# Patient Record
Sex: MALE | Race: WHITE | Employment: OTHER | URBAN - METROPOLITAN AREA
[De-identification: names, ages, dates, MRNs, and addresses within clinical notes are randomized per-mention and may not be internally consistent; named-entity substitution may affect disease eponyms.]

---

## 2022-11-04 DIAGNOSIS — M81.0 AGE-RELATED OSTEOPOROSIS WITHOUT CURRENT PATHOLOGICAL FRACTURE: Primary | ICD-10-CM

## 2022-11-04 RX ORDER — DIPHENHYDRAMINE HYDROCHLORIDE 50 MG/ML
50 INJECTION INTRAMUSCULAR; INTRAVENOUS
OUTPATIENT
Start: 2022-11-04

## 2022-11-04 RX ORDER — ACETAMINOPHEN 325 MG/1
650 TABLET ORAL
OUTPATIENT
Start: 2022-11-04

## 2022-11-04 RX ORDER — SODIUM CHLORIDE 9 MG/ML
INJECTION, SOLUTION INTRAVENOUS CONTINUOUS
OUTPATIENT
Start: 2022-11-04

## 2022-11-04 RX ORDER — ONDANSETRON 2 MG/ML
8 INJECTION INTRAMUSCULAR; INTRAVENOUS
OUTPATIENT
Start: 2022-11-04

## 2022-11-04 RX ORDER — EPINEPHRINE 1 MG/ML
0.3 INJECTION, SOLUTION, CONCENTRATE INTRAVENOUS PRN
OUTPATIENT
Start: 2022-11-04

## 2022-11-04 RX ORDER — ALBUTEROL SULFATE 90 UG/1
4 AEROSOL, METERED RESPIRATORY (INHALATION) PRN
OUTPATIENT
Start: 2022-11-04

## 2023-06-19 ENCOUNTER — HOSPITAL ENCOUNTER (OUTPATIENT)
Dept: VASCULAR LAB | Age: 83
Discharge: HOME | End: 2023-06-19
Payer: MEDICARE

## 2023-06-19 DIAGNOSIS — I65.23 BILATERAL CAROTID ARTERY STENOSIS: ICD-10-CM

## 2023-06-19 PROCEDURE — 93880 EXTRACRANIAL BILAT STUDY: CPT

## 2023-09-26 LAB
PROSTATE SPECIFIC ANTIGEN: 0.38 NG/ML
TESTOSTERONE TOTAL: 8.72 NG/DL

## 2024-01-29 LAB
PROSTATE SPECIFIC ANTIGEN: 0.49 NG/ML
TESTOSTERONE TOTAL: 6.95 NG/DL

## 2024-04-09 ENCOUNTER — TELEPHONE (OUTPATIENT)
Dept: UROLOGY | Age: 84
End: 2024-04-09

## 2024-04-09 LAB
PROSTATE SPECIFIC ANTIGEN: <0.06 NG/ML
TESTOSTERONE TOTAL: 12.5 NG/DL

## 2024-04-09 NOTE — TELEPHONE ENCOUNTER
Patient scheduled for [facility] 4/15, needs PA. Patient coming from [facility].

## 2024-04-15 ENCOUNTER — OFFICE VISIT (OUTPATIENT)
Dept: UROLOGY | Age: 84
End: 2024-04-15
Payer: MEDICARE

## 2024-04-15 VITALS
TEMPERATURE: 97.5 F | BODY MASS INDEX: 23.05 KG/M2 | HEART RATE: 52 BPM | HEIGHT: 70 IN | OXYGEN SATURATION: 99 % | WEIGHT: 161 LBS | DIASTOLIC BLOOD PRESSURE: 81 MMHG | SYSTOLIC BLOOD PRESSURE: 134 MMHG

## 2024-04-15 DIAGNOSIS — C61 PROSTATE CANCER (HCC): Primary | ICD-10-CM

## 2024-04-15 DIAGNOSIS — R97.20 RISING PSA LEVEL: ICD-10-CM

## 2024-04-15 PROCEDURE — 1123F ACP DISCUSS/DSCN MKR DOCD: CPT | Performed by: NURSE PRACTITIONER

## 2024-04-15 PROCEDURE — 99204 OFFICE O/P NEW MOD 45 MIN: CPT | Performed by: NURSE PRACTITIONER

## 2024-04-15 RX ORDER — FAMOTIDINE 20 MG/1
20 TABLET, FILM COATED ORAL 2 TIMES DAILY
COMMUNITY

## 2024-04-15 RX ORDER — AMLODIPINE BESYLATE 5 MG/1
5 TABLET ORAL DAILY
COMMUNITY

## 2024-04-15 RX ORDER — ASPIRIN 81 MG/1
81 TABLET, CHEWABLE ORAL DAILY
COMMUNITY

## 2024-04-15 RX ORDER — ISOSORBIDE MONONITRATE 60 MG/1
60 TABLET, EXTENDED RELEASE ORAL DAILY
COMMUNITY

## 2024-04-15 RX ORDER — NITROGLYCERIN 0.4 MG/1
0.4 TABLET SUBLINGUAL EVERY 5 MIN PRN
COMMUNITY

## 2024-04-15 RX ORDER — ALPRAZOLAM 0.25 MG/1
0.25 TABLET ORAL NIGHTLY PRN
COMMUNITY

## 2024-04-15 RX ORDER — LISINOPRIL 10 MG/1
10 TABLET ORAL DAILY
COMMUNITY

## 2024-04-15 RX ORDER — ATORVASTATIN CALCIUM 40 MG/1
40 TABLET, FILM COATED ORAL DAILY
COMMUNITY

## 2024-04-15 ASSESSMENT — ENCOUNTER SYMPTOMS
CONSTIPATION: 0
COUGH: 0
VOMITING: 0
DIARRHEA: 0
ABDOMINAL PAIN: 0
EYE PAIN: 0
NAUSEA: 0
WHEEZING: 0
EYE REDNESS: 0
SHORTNESS OF BREATH: 0
BACK PAIN: 0

## 2024-04-15 NOTE — TELEPHONE ENCOUNTER
Approval/order # [id number] Valid 4/15/24-3/17/25 for 2 treatments.  Obtained through Jhon with [facility] [phone]

## 2024-04-17 ENCOUNTER — TELEPHONE (OUTPATIENT)
Dept: UROLOGY | Age: 84
End: 2024-04-17

## 2024-04-17 DIAGNOSIS — C61 PROSTATE CANCER (HCC): ICD-10-CM

## 2024-04-17 NOTE — TELEPHONE ENCOUNTER
Patient called into the office, stated that he would like his Orgovyx sent to [facility] pharmacy vs [facility] pharmacy.

## 2024-04-29 ENCOUNTER — OFFICE VISIT (OUTPATIENT)
Dept: UROLOGY | Age: 84
End: 2024-04-29
Payer: MEDICARE

## 2024-04-29 VITALS
HEIGHT: 70 IN | OXYGEN SATURATION: 97 % | HEART RATE: 68 BPM | DIASTOLIC BLOOD PRESSURE: 60 MMHG | WEIGHT: 161 LBS | BODY MASS INDEX: 23.05 KG/M2 | TEMPERATURE: 98 F | SYSTOLIC BLOOD PRESSURE: 120 MMHG

## 2024-04-29 DIAGNOSIS — R97.20 RISING PSA LEVEL: ICD-10-CM

## 2024-04-29 DIAGNOSIS — C61 PROSTATE CANCER (HCC): Primary | ICD-10-CM

## 2024-04-29 PROCEDURE — 1123F ACP DISCUSS/DSCN MKR DOCD: CPT | Performed by: UROLOGY

## 2024-04-29 PROCEDURE — 99214 OFFICE O/P EST MOD 30 MIN: CPT | Performed by: UROLOGY

## 2024-04-29 ASSESSMENT — ENCOUNTER SYMPTOMS
GASTROINTESTINAL NEGATIVE: 1
NAUSEA: 0
BACK PAIN: 0
ABDOMINAL PAIN: 0
WHEEZING: 0
EYES NEGATIVE: 1
COLOR CHANGE: 0
COUGH: 0
RESPIRATORY NEGATIVE: 1
EYE PAIN: 0
VOMITING: 0
ALLERGIC/IMMUNOLOGIC NEGATIVE: 1
EYE REDNESS: 0
SHORTNESS OF BREATH: 0

## 2024-04-29 NOTE — PROGRESS NOTES
[facility] PHYSICIANS [facility] UROLOGY CENTER  [address]  [facility] [address]  Dept: [phone]    [facility] Urology Office Note - Established    Patient:  Pranav oL  YOB: 1940  Date: 4/29/2024    The patient is a 83 y.o. male who presents todayfor evaluation of the following problems:   Chief Complaint   Patient presents with    discuss prostate cancer meds       HPI  This is a very pleasant 83-year-old gentleman with a history of advanced prostate cancer and he has been on Orgovyx.  He did have a rising PSA and we started him on Xtandi.  His PSA did become undetectable but he did not tolerate the Xtandi and stopped taking it due to blood pressure volatility and joint pain.    Summary of old records: N/A    Additional History: N/A    Procedures Today: N/A    Urinalysis today:  No results found for this visit on 04/29/24.  Last several PSA's:  Lab Results   Component Value Date    PSA <0.06 04/09/2024    PSA 0.49 01/29/2024    PSA 0.38 09/26/2023     Last total testosterone:  Lab Results   Component Value Date    TESTOSTERONE 12.5 (L) 04/09/2024       AUA Symptom Score (4/29/2024):                               Last BUN and creatinine:  No results found for: \"BUN\"  No results found for: \"CREATININE\"    Additional Lab/Culture results: none    Imaging Reviewed during this Office Visit: none  (results were independently reviewed by physician and radiology report verified)    PAST MEDICAL, FAMILY AND SOCIAL HISTORY UPDATE:  No past medical history on file.  No past surgical history on file.  No family history on file.  Outpatient Medications Marked as Taking for the 4/29/24 encounter (Office Visit) with Dilan Keller MD   Medication Sig Dispense Refill    Relugolix 120 MG TABS Take 120 mg by mouth daily 30 tablet 11    nitroGLYCERIN (NITROSTAT) 0.4 MG SL tablet Place 1 tablet under the tongue every 5 minutes as needed for Chest pain up

## 2024-08-06 ENCOUNTER — HOSPITAL ENCOUNTER (OUTPATIENT)
Age: 84
Discharge: HOME | End: 2024-08-06
Payer: MEDICARE

## 2024-08-06 LAB
PSA SERPL-MCNC: 0.4 NG/ML (ref 0–4)
TESTOST SERPL-MCNC: 3 NG/DL (ref 193–740)

## 2024-08-06 PROCEDURE — 84153 ASSAY OF PSA TOTAL: CPT

## 2024-08-06 PROCEDURE — 36415 COLL VENOUS BLD VENIPUNCTURE: CPT

## 2024-08-06 PROCEDURE — 84403 ASSAY OF TOTAL TESTOSTERONE: CPT

## 2024-08-12 ENCOUNTER — OFFICE VISIT (OUTPATIENT)
Dept: UROLOGY | Age: 84
End: 2024-08-12
Payer: MEDICARE

## 2024-08-12 VITALS
TEMPERATURE: 97.6 F | BODY MASS INDEX: 23.19 KG/M2 | WEIGHT: 162 LBS | DIASTOLIC BLOOD PRESSURE: 68 MMHG | HEART RATE: 64 BPM | OXYGEN SATURATION: 95 % | HEIGHT: 70 IN | SYSTOLIC BLOOD PRESSURE: 110 MMHG

## 2024-08-12 DIAGNOSIS — C61 PROSTATE CANCER (HCC): Primary | ICD-10-CM

## 2024-08-12 DIAGNOSIS — R97.20 RISING PSA LEVEL: ICD-10-CM

## 2024-08-12 PROCEDURE — 99214 OFFICE O/P EST MOD 30 MIN: CPT | Performed by: UROLOGY

## 2024-08-12 PROCEDURE — 1123F ACP DISCUSS/DSCN MKR DOCD: CPT | Performed by: UROLOGY

## 2024-08-12 RX ORDER — DAROLUTAMIDE 300 MG/1
600 TABLET, FILM COATED ORAL 2 TIMES DAILY
Qty: 60 TABLET | Refills: 5 | Status: SHIPPED | OUTPATIENT
Start: 2024-08-12 | End: 2024-08-15 | Stop reason: SDUPTHER

## 2024-08-12 ASSESSMENT — ENCOUNTER SYMPTOMS
RESPIRATORY NEGATIVE: 1
VOMITING: 0
WHEEZING: 0
BACK PAIN: 0
EYES NEGATIVE: 1
GASTROINTESTINAL NEGATIVE: 1
ALLERGIC/IMMUNOLOGIC NEGATIVE: 1
ABDOMINAL PAIN: 0
SHORTNESS OF BREATH: 0
EYE REDNESS: 0
COLOR CHANGE: 0
EYE PAIN: 0
NAUSEA: 0
COUGH: 0

## 2024-08-12 NOTE — PROGRESS NOTES
[facility] PHYSICIANS [facility] UROLOGY CENTER  [address]  [facility] [address]  Dept: [phone]    [facility] Urology Office Note - Established    Patient:  Pranav Lo  YOB: 1940  Date: 8/12/2024    The patient is a 84 y.o. male who presents todayfor evaluation of the following problems:   Chief Complaint   Patient presents with    Prostate Cancer     3 month w/ PSA         HPI  This is an 84-year-old gentleman with prostate cancer.  He has been on Orgovyx and was previously taking Xtandi.  Although this was suppressing his PSA to undetectable levels, he was having side effects so he stopped taking this a few months ago.  His PSA has bumped back up to 0.4.    Summary of old records: N/A    Additional History: N/A    Procedures Today: N/A    Urinalysis today:  No results found for this visit on 08/12/24.  Last several PSA's:  Lab Results   Component Value Date    PSA 0.40 08/06/2024    PSA <0.06 04/09/2024    PSA 0.49 01/29/2024     Last total testosterone:  Lab Results   Component Value Date    TESTOSTERONE 3 (L) 08/06/2024       AUA Symptom Score (8/12/2024):                               Last BUN and creatinine:  No results found for: \"BUN\"  No results found for: \"CREATININE\"    Additional Lab/Culture results: none    Imaging Reviewed during this Office Visit: none  (results were independently reviewed by physician and radiology report verified)    PAST MEDICAL, FAMILY AND SOCIAL HISTORY UPDATE:  No past medical history on file.  No past surgical history on file.  No family history on file.  Outpatient Medications Marked as Taking for the 8/12/24 encounter (Office Visit) with Dilan Keller MD   Medication Sig Dispense Refill    Relugolix 120 MG TABS Take 120 mg by mouth daily 30 tablet 11    nitroGLYCERIN (NITROSTAT) 0.4 MG SL tablet Place 1 tablet under the tongue every 5 minutes as needed for Chest pain up to max of 3 total doses.

## 2024-08-15 ENCOUNTER — TELEPHONE (OUTPATIENT)
Dept: UROLOGY | Age: 84
End: 2024-08-15

## 2024-08-15 DIAGNOSIS — C61 PROSTATE CANCER (HCC): ICD-10-CM

## 2024-08-15 RX ORDER — DAROLUTAMIDE 300 MG/1
600 TABLET, FILM COATED ORAL 2 TIMES DAILY
Qty: 60 TABLET | Refills: 5 | Status: SHIPPED | OUTPATIENT
Start: 2024-08-15

## 2024-08-15 RX ORDER — DAROLUTAMIDE 300 MG/1
600 TABLET, FILM COATED ORAL 2 TIMES DAILY
Qty: 60 TABLET | Refills: 5 | Status: SHIPPED | OUTPATIENT
Start: 2024-08-15 | End: 2024-08-15 | Stop reason: SDUPTHER

## 2024-08-16 NOTE — TELEPHONE ENCOUNTER
[facility] LM on clinic requesting most recent office notes to process the PA for the patient.  Office notes were faxed to [phone].

## 2024-10-14 ENCOUNTER — HOSPITAL ENCOUNTER (OUTPATIENT)
Age: 84
Discharge: HOME | End: 2024-10-14
Payer: MEDICARE

## 2024-10-14 DIAGNOSIS — C61 PROSTATE CANCER (HCC): ICD-10-CM

## 2024-10-14 LAB
PSA SERPL-MCNC: 0.1 NG/ML (ref 0–4)
TESTOST SERPL-MCNC: <3 NG/DL (ref 193–740)

## 2024-10-14 PROCEDURE — 84153 ASSAY OF PSA TOTAL: CPT

## 2024-10-14 PROCEDURE — 36415 COLL VENOUS BLD VENIPUNCTURE: CPT

## 2024-10-14 PROCEDURE — 84403 ASSAY OF TOTAL TESTOSTERONE: CPT

## 2024-10-21 ENCOUNTER — OFFICE VISIT (OUTPATIENT)
Dept: UROLOGY | Age: 84
End: 2024-10-21
Payer: MEDICARE

## 2024-10-21 VITALS
HEART RATE: 62 BPM | BODY MASS INDEX: 23.19 KG/M2 | HEIGHT: 70 IN | WEIGHT: 162 LBS | DIASTOLIC BLOOD PRESSURE: 74 MMHG | SYSTOLIC BLOOD PRESSURE: 130 MMHG | TEMPERATURE: 97.6 F | OXYGEN SATURATION: 95 %

## 2024-10-21 DIAGNOSIS — C61 PROSTATE CANCER (HCC): Primary | ICD-10-CM

## 2024-10-21 PROCEDURE — 1123F ACP DISCUSS/DSCN MKR DOCD: CPT | Performed by: UROLOGY

## 2024-10-21 PROCEDURE — 99213 OFFICE O/P EST LOW 20 MIN: CPT | Performed by: UROLOGY

## 2024-10-21 RX ORDER — MOMETASONE FUROATE 1 MG/G
CREAM TOPICAL
COMMUNITY

## 2024-10-21 RX ORDER — LOSARTAN POTASSIUM 100 MG/1
TABLET ORAL
COMMUNITY

## 2024-10-21 RX ORDER — ENZALUTAMIDE 40 MG/1
TABLET ORAL
COMMUNITY

## 2024-10-21 RX ORDER — FLUTICASONE PROPIONATE 50 MCG
SPRAY, SUSPENSION (ML) NASAL
COMMUNITY

## 2024-10-21 RX ORDER — BIMATOPROST 0.1 MG/ML
SOLUTION/ DROPS OPHTHALMIC
COMMUNITY

## 2024-10-21 RX ORDER — TRIAMCINOLONE ACETONIDE 1 MG/G
OINTMENT TOPICAL
COMMUNITY

## 2024-10-21 RX ORDER — LEVOFLOXACIN 750 MG/1
TABLET, FILM COATED ORAL
COMMUNITY

## 2024-10-21 RX ORDER — LEUPROLIDE ACETATE 45 MG
KIT SUBCUTANEOUS
COMMUNITY

## 2024-10-21 ASSESSMENT — ENCOUNTER SYMPTOMS
ABDOMINAL PAIN: 0
BACK PAIN: 0
COUGH: 0
VOMITING: 0
RESPIRATORY NEGATIVE: 1
COLOR CHANGE: 0
GASTROINTESTINAL NEGATIVE: 1
ALLERGIC/IMMUNOLOGIC NEGATIVE: 1
EYES NEGATIVE: 1
NAUSEA: 0
WHEEZING: 0
SHORTNESS OF BREATH: 0
EYE PAIN: 0
EYE REDNESS: 0

## 2024-10-21 NOTE — PROGRESS NOTES
[facility] PHYSICIANS [facility] UROLOGY CENTER  [address]  [facility] [address]  Dept: [phone]    [facility] Urology Office Note - Established    Patient:  Pranav Lo  YOB: 1940  Date: 10/21/2024    The patient is a 84 y.o. male who presents todayfor evaluation of the following problems:   Chief Complaint   Patient presents with    Prostate cancer (HCC)     2 month with labs       HPI  This is a very pleasant 84-year-old gentleman who has a history of prostate cancer.  He was on Orgovyx and Xtandi and stopped taking Xtandi due to side effects.  He has been on Nubeqa.  He has also continued Orgovyx.  His PSA has come down to 0.1.    Summary of old records: N/A    Additional History: N/A    Procedures Today: N/A    Urinalysis today:  No results found for this visit on 10/21/24.  Last several PSA's:  Lab Results   Component Value Date    PSA 0.10 10/14/2024    PSA 0.40 08/06/2024    PSA <0.06 04/09/2024     Last total testosterone:  Lab Results   Component Value Date    TESTOSTERONE <3 (L) 10/14/2024       AUA Symptom Score (10/21/2024):                               Last BUN and creatinine:  No results found for: \"BUN\"  No results found for: \"CREATININE\"    Additional Lab/Culture results: none    Imaging Reviewed during this Office Visit: none  (results were independently reviewed by physician and radiology report verified)    PAST MEDICAL, FAMILY AND SOCIAL HISTORY UPDATE:  No past medical history on file.  No past surgical history on file.  No family history on file.  Outpatient Medications Marked as Taking for the 10/21/24 encounter (Office Visit) with Dilan Keller MD   Medication Sig Dispense Refill    LUMIGAN 0.01 % SOLN ophthalmic drops       triamcinolone (KENALOG) 0.1 % ointment       mometasone (ELOCON) 0.1 % cream APPLY A THIN LAYER TO AFFECTED AREA(S) BY TOPICAL ROUTE ONCE DAILY      nitroGLYCERIN (NITROSTAT) 0.4 MG SL

## 2025-02-06 DIAGNOSIS — C61 PROSTATE CANCER (HCC): ICD-10-CM

## 2025-02-06 NOTE — TELEPHONE ENCOUNTER
Last seen 10/21/24  Plan:   Cont orgovyx and nubeqa  F/u 5 mo psa and T (pt does not wish to see any more frequently than that)        Assessment & Plan  Return in about 5 months (around 3/21/2025) for psa and T.  Next OV 3/24/25

## 2025-02-07 ENCOUNTER — TELEPHONE (OUTPATIENT)
Dept: UROLOGY | Age: 85
End: 2025-02-07

## 2025-02-07 RX ORDER — DAROLUTAMIDE 300 MG/1
600 TABLET, FILM COATED ORAL 2 TIMES DAILY
Qty: 60 TABLET | Refills: 5 | Status: SHIPPED | OUTPATIENT
Start: 2025-02-07

## 2025-02-07 NOTE — TELEPHONE ENCOUNTER
Pharmacy call in and requested medication dose be changed to 120 tablet of Nubeqa from 60 tablet pre month because patient takes 4 times daily. chandra Riddle for the dose change.

## 2025-03-24 ENCOUNTER — HOSPITAL ENCOUNTER (OUTPATIENT)
Age: 85
Discharge: HOME | End: 2025-03-24
Payer: MEDICARE

## 2025-03-24 DIAGNOSIS — C61 PROSTATE CANCER (HCC): ICD-10-CM

## 2025-03-24 LAB
PSA SERPL-MCNC: <0.03 NG/ML (ref 0–4)
TESTOST SERPL-MCNC: <3 NG/DL (ref 193–740)

## 2025-03-24 PROCEDURE — 84403 ASSAY OF TOTAL TESTOSTERONE: CPT

## 2025-03-24 PROCEDURE — 84153 ASSAY OF PSA TOTAL: CPT

## 2025-03-24 PROCEDURE — 36415 COLL VENOUS BLD VENIPUNCTURE: CPT

## 2025-03-31 ENCOUNTER — OFFICE VISIT (OUTPATIENT)
Dept: UROLOGY | Age: 85
End: 2025-03-31
Payer: MEDICARE

## 2025-03-31 VITALS
WEIGHT: 162 LBS | HEIGHT: 70 IN | SYSTOLIC BLOOD PRESSURE: 122 MMHG | OXYGEN SATURATION: 97 % | BODY MASS INDEX: 23.19 KG/M2 | TEMPERATURE: 97.6 F | DIASTOLIC BLOOD PRESSURE: 76 MMHG | HEART RATE: 68 BPM

## 2025-03-31 DIAGNOSIS — C61 PROSTATE CANCER (HCC): Primary | ICD-10-CM

## 2025-03-31 PROCEDURE — 1159F MED LIST DOCD IN RCRD: CPT | Performed by: UROLOGY

## 2025-03-31 PROCEDURE — 99213 OFFICE O/P EST LOW 20 MIN: CPT | Performed by: UROLOGY

## 2025-03-31 PROCEDURE — 1123F ACP DISCUSS/DSCN MKR DOCD: CPT | Performed by: UROLOGY

## 2025-03-31 RX ORDER — DAROLUTAMIDE 300 MG/1
600 TABLET, FILM COATED ORAL 2 TIMES DAILY
Qty: 60 TABLET | Refills: 5 | Status: SHIPPED | OUTPATIENT
Start: 2025-03-31

## 2025-03-31 ASSESSMENT — ENCOUNTER SYMPTOMS
VOMITING: 0
RESPIRATORY NEGATIVE: 1
COUGH: 0
EYES NEGATIVE: 1
ABDOMINAL PAIN: 0
EYE REDNESS: 0
GASTROINTESTINAL NEGATIVE: 1
NAUSEA: 0
COLOR CHANGE: 0
WHEEZING: 0
SHORTNESS OF BREATH: 0
BACK PAIN: 0
EYE PAIN: 0
ALLERGIC/IMMUNOLOGIC NEGATIVE: 1

## 2025-03-31 NOTE — PROGRESS NOTES
Review of Systems   Constitutional: Negative.  Negative for appetite change, chills and fever.   HENT: Negative.     Eyes: Negative.  Negative for pain, redness and visual disturbance.   Respiratory: Negative.  Negative for cough, shortness of breath and wheezing.    Cardiovascular: Negative.  Negative for chest pain and leg swelling.   Gastrointestinal: Negative.  Negative for abdominal pain, nausea and vomiting.   Endocrine: Negative.    Genitourinary: Negative.  Negative for difficulty urinating, dysuria, flank pain, frequency, hematuria, testicular pain and urgency.   Musculoskeletal: Negative.  Negative for back pain, joint swelling and myalgias.   Skin: Negative.  Negative for color change, rash and wound.   Allergic/Immunologic: Negative.    Neurological: Negative.  Negative for dizziness, tremors, weakness, numbness and headaches.   Hematological: Negative.  Negative for adenopathy. Does not bruise/bleed easily.   Psychiatric/Behavioral: Negative.       
mometasone (ELOCON) 0.1 % cream APPLY A THIN LAYER TO AFFECTED AREA(S) BY TOPICAL ROUTE ONCE DAILY      leuprolide acetate, 6 Month, (ELIGARD) 45 MG injection Inject into the skin      fluticasone (FLONASE) 50 MCG/ACT nasal spray 2 sprays each nostril once a day      nitroGLYCERIN (NITROSTAT) 0.4 MG SL tablet Place 1 tablet under the tongue every 5 minutes as needed for Chest pain up to max of 3 total doses. If no relief after 1 dose, call 911.      ALPRAZolam (XANAX) 0.25 MG tablet Take 1 tablet by mouth nightly as needed for Sleep.      lisinopril (PRINIVIL;ZESTRIL) 10 MG tablet Take 1 tablet by mouth daily      isosorbide mononitrate (IMDUR) 60 MG extended release tablet Take 1 tablet by mouth daily      amLODIPine (NORVASC) 5 MG tablet Take 1 tablet by mouth daily      atorvastatin (LIPITOR) 40 MG tablet Take 1 tablet by mouth daily      famotidine (PEPCID) 20 MG tablet Take 1 tablet by mouth 2 times daily      aspirin 81 MG chewable tablet Take 1 tablet by mouth daily         Patient has no known allergies.  Social History     Tobacco Use   Smoking Status Never    Passive exposure: Never   Smokeless Tobacco Never     (Ifpatient a smoker, smoking cessation counseling offered)    Social History     Substance and Sexual Activity   Alcohol Use Never       REVIEW OF SYSTEMS:  Review of Systems    Physical Exam:      Vitals:    03/31/25 1123   BP: 122/76   Pulse: 68   Temp: 97.6 °F (36.4 °C)   SpO2: 97%     Body mass index is 23.24 kg/m².  Patient is a 84 y.o. male in no acute distress and alert and oriented to person, place and time.  Physical Exam  Constitutional: Patient in no acute distress.  Neuro: Alert and oriented to person, place and time.  Psych: Mood normal, affect normal  Skin: No rash noted  HEENT: Head: Normocephalic andatraumatic  Conjunctivae and EOM are normal. Pupils are equal, round  Nose:Normal      Assessment and Plan      1. Prostate cancer (HCC)           Plan:   Cont orgovyx and Nubeqa  F/u 6